# Patient Record
(demographics unavailable — no encounter records)

---

## 2024-12-18 NOTE — REVIEW OF SYSTEMS
[Negative] : Heme/Lymph [Ear Pain] : no ear pain [Ear Itch] : no ear itch [Hearing Loss] : no hearing loss [Dizziness] : no dizziness [Vertigo] : no vertigo [Recurrent Ear Infections] : no recurrent ear infections [Ear Drainage] : no ear drainage [Ear Noises] : no ear noises [Lightheadedness] : no lightheadedness [de-identified] : ears cleaning

## 2024-12-18 NOTE — PHYSICAL EXAM
[de-identified] : bilat cerumen impaction  [de-identified] : after cerumen removal  [Midline] : trachea located in midline position [Normal] : no rashes

## 2024-12-18 NOTE — ASSESSMENT
[FreeTextEntry1] : Patient c/o clogged ears bilat - has bilat impacted cerumen - after removal exam normal - has bilat symmetric snhl on prior audio   -  recommended followup 6 mos and recommended audio -  declined today

## 2025-01-24 NOTE — PHYSICAL EXAM
[de-identified] :                    Well appearing and nourished with no obvious deformities or distress.  Eyes:  No conjunctival injection and no xanthelasmas. HEENT:  Normocephalic.Normal oral mucosa. No pallor or cyanosis Neck:  No jugular venous distension. with normal A and V wave forms. No palpable adenopathy. Cardiovascular:  Normal rate and rhythm with normal S1, S2 and a grade 1/6 systolic murmur. Distal arterial pulses are normal. No significant peripheral edema. Pulmonary:  Lungs are clear to auscultation and percussion. Normal respiratory pattern without any accessory muscle use Abdomen:  Soft, non-tender ; no palpable organomegaly or masses. Extremities: No digital clubbing, cyanosis or ischemic changes. Skin:  No skin lesions, rashes, ulcers or xanthomas. Psychiatric:  Alert and oriented to person, place and time. Appropriate mood and affect.

## 2025-01-24 NOTE — ASSESSMENT
1.  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 Stressed the importance of strong glucose control. Advised of importance of at least yearly dilated examinations but to contact us immediately for any problems or concerns. 2. Cataract OU: Observe for now without intervention. The patient was advised to contact us if any change or worsening of vision3. AJ w/ PEK OU - Recommend ATs QID OU routinely 4. PVD w/o Tear OD - RD precautions. 5.  H/o CL Wear - (Monovision OS Near) Return for an appointment in 6 months 27 with Dr. Vincent Ramires. [FreeTextEntry1] :  Laboratory data  ------------9/1/2023---3/9/24--8/26/24 Chol-------194----------133------166 HDL--------55------------46-------55 LDL--------116-----------73-------94 Trigs--------69-----------66-------92  CT calcium score 12/21/2023 Total: 18 LAD: 18 Other arteries: 0.  Stress test 1/17/2025 Time: 12 minutes (13 minutes) Heart rate: 48 (94%) ECG: No significant ST-T wave changes.  Echocardiogram 1/17/2025 LVEF 60 to 65% No significant valvular disease Ascending aorta measuring within normal limits.  Echocardiogram 1/5/2024: Overall normal LV size and function ejection fraction is 65% No evidence of LVH Focal aortic valve sclerosis Mild dilatation of the ascending aorta 3.8 cm.  Impression: 1.  Hypertension: Adequate control now being achieved with lisinopril 20 mg      No evidence of hypertensive heart disease on echocardiography with hyperdynamic function.      HCT was discontinued after syncopal event 1/6/2024.  Probably accounted for by the weight loss and structured dietary controls.  2.  Hyperlipidemia with reasonable control based on the current data.  CAC is pretty low and patient is avidly dieting and exercising.  Expect to see further lowering of his LDL and subsequent blood work.  3.  Stress testing demonstrating very good exercise tolerance and absence of inducible ischemia arrhythmia.  Excellent exercise regimen with no exercise related symptoms  4.  Minor nonspecific ECG changes.  5.  Previously seen minimal dilatation of the ascending aorta 3.8 cm is not demonstrated on the current echo.  Plan: 1.  Can continue current atorvastatin dosing  Would like to see LDL less than 100.  2.  Patient will continue with lisinopril 20 mg daily and contact us if he has any progressive dizziness or orthostatic changes of significance.  3.  Continuation of aggressive diet and exercise  4.  No other changes in current management follow-up here in 6 to 12 months.

## 2025-01-24 NOTE — REASON FOR VISIT
[FreeTextEntry1] : 63 -year-old male presents here for cardiac evaluation   History includes: 1.  Hypertension 2.  Hyperlipidemia 3.  Mild dilatation of ascending aorta  Dx of hypertension November 2023, Rx with lisinopril - titrated upwards to lisinopril HCT 20/12.5 1/6/2024 patient had a syncopal event.  He had had other episodes of orthostatic type dizziness as a prodrome to this. Based on this we discontinue the hydrochlorothiazide component continue with just lisinopril 20 mg a day Review of his home blood pressures suggest that he has at this point averaging 123/70 mmHg on this dose.  The patient states his blood pressure tended to be a bit on the high normal side for a few years. Denies diabetes smoking or family Struve premature coronary disease History of hyperlipidemia on atorvastatin therapy.  He exercises avidly nearly 6 days a week both resistance and aerobic exercising without any difficulty.  There is no chest pain, shortness of breath, palpitations. No PND, orthopnea or edema. No syncope or history of heart murmur.  Has become more stringent with his diet both as regards caloric intake as well as sodium with improvement in his weight.  He does believe that he had dropped about 10 pounds before the syncopal event.

## 2025-01-26 NOTE — PHYSICAL EXAM
[de-identified] :                    Well appearing and nourished with no obvious deformities or distress.  Eyes:  No conjunctival injection and no xanthelasmas. HEENT:  Normocephalic.Normal oral mucosa. No pallor or cyanosis Neck:  No jugular venous distension. with normal A and V wave forms. No palpable adenopathy. Cardiovascular:  Normal rate and rhythm with normal S1, S2 and a grade 1/6 systolic murmur. Distal arterial pulses are normal. No significant peripheral edema. Pulmonary:  Lungs are clear to auscultation and percussion. Normal respiratory pattern without any accessory muscle use Abdomen:  Soft, non-tender ; no palpable organomegaly or masses. Extremities: No digital clubbing, cyanosis or ischemic changes. Skin:  No skin lesions, rashes, ulcers or xanthomas. Psychiatric:  Alert and oriented to person, place and time. Appropriate mood and affect.

## 2025-01-26 NOTE — ASSESSMENT
[FreeTextEntry1] : :ECG: Normal sinus rhythm at 79 bpm.  No significant ST or T wave changes.  Laboratory data  ------------9/1/2023---3/9/24 Chol-------194----------133 HDL--------55------------46 LDL--------116-----------73 Trigs--------69-----------66  CT calcium score 12/21/2023 Total: 18 LAD: 18 Other arteries: 0.  Echocardiogram 1/5/2024: Overall normal LV size and function ejection fraction is 65% No evidence of LVH Focal aortic valve sclerosis Mild dilatation of the ascending aorta 3.8 cm.  Impression: 1.  Hypertension: Adequate control now being achieved with lisinopril 20 mg      No evidence of hypertensive heart disease on echocardiography with hyperdynamic function.      HCT was discontinued after syncopal event 1/6/2024.  Probably accounted for by the weight loss and structured dietary controls.  2.  Hyperlipidemia with reasonable control based on the current data.  CAC is pretty low and patient is avidly dieting and exercising.  Expect to see further lowering of his LDL and subsequent blood work.  3.  Excellent exercise regimen with no exercise related symptoms  4.  Minor nonspecific ECG changes.  5.  Minimal dilatation of the ascending aorta 3.8 cm.  Plan: 1.  Can continue current atorvastatin dosing  Would like to see LDL less than 100.  2.  Patient will continue with lisinopril 20 mg daily and contact us if he has any progressive dizziness or orthostatic changes of significance.  3.  Continuation of aggressive diet and exercise  4.  Echocardiogram to reassess the ascending aorta dilatation and a screening exercise stress test in 6 months prior to follow-up.

## 2025-01-26 NOTE — REASON FOR VISIT
[FreeTextEntry1] : 62 -year-old male presents here for cardiac evaluation   History includes: 1.  Hypertension 2.  Hyperlipidemia 3.  Mild dilatation of ascending aorta  After detection of hypertension November 2023, treatment was started with lisinopril was titrated upwards to lisinopril HCT 20/12.5 1/6/2024 patient had a syncopal event.  He had had other episodes of orthostatic type dizziness as a prodrome to this. Based on this we discontinue the hydrochlorothiazide component continue with just lisinopril 20 mg a day Review of his home blood pressures suggest that he has at this point averaging 123/70 mmHg on this dose.  The patient states his blood pressure tended to be a bit on the high normal side for a few years. Denies diabetes smoking or family Struve premature coronary disease History of hyperlipidemia on atorvastatin therapy.  He exercises avidly nearly 6 days a week both resistance and aerobic exercising without any difficulty.  There is no chest pain, shortness of breath, palpitations. No PND, orthopnea or edema. No syncope or history of heart murmur.  Has become more stringent with his diet both as regards caloric intake as well as sodium with improvement in his weight.  He does believe that he had dropped about 10 pounds before the syncopal event.

## 2025-06-09 NOTE — PHYSICAL EXAM
[de-identified] : after cerumen removal  [de-identified] : bilat cerumen impaction  [Midline] : trachea located in midline position [Normal] : no rashes

## 2025-06-09 NOTE — ASSESSMENT
[FreeTextEntry1] : Patient c/o clogged ears bilat - has bilat impacted cerumen - after removal exam normal - has bilat symmetric snhl on prior audio   -  recommended followup 6 mos and recommended audio -  declined today again.

## 2025-06-23 NOTE — PHYSICAL EXAM
[de-identified] :                    Well appearing and nourished with no obvious deformities or distress.  Eyes:  No conjunctival injection and no xanthelasmas. HEENT:  Normocephalic.Normal oral mucosa. No pallor or cyanosis Neck:  No jugular venous distension. with normal A and V wave forms. No palpable adenopathy. Cardiovascular:  Normal rate and rhythm with normal S1, S2 and a grade 1/6 systolic murmur. Distal arterial pulses are normal. No significant peripheral edema. Pulmonary:  Lungs are clear to auscultation and percussion. Normal respiratory pattern without any accessory muscle use Abdomen:  Soft, non-tender ; no palpable organomegaly or masses. Extremities: No digital clubbing, cyanosis or ischemic changes. Skin:  No skin lesions, rashes, ulcers or xanthomas. Psychiatric:  Alert and oriented to person, place and time. Appropriate mood and affect.

## 2025-06-23 NOTE — ASSESSMENT
[FreeTextEntry1] :    ECG: Sinus rhythm at 81, right bundle branch block pattern with no significant ST-T wave changes.  Unchanged from prior.  Laboratory data  ------------9/1/2023---3/9/24--8/26/24--2/24/25 Chol-------194----------133------166------149 HDL--------55------------46-------55--------45 LDL--------116-----------73-------94--------88 Trigs--------69-----------66-------92--------85  CT calcium score 12/21/2023 Total: 18 LAD: 18 Other arteries: 0.  Stress test 1/17/2025 Time: 12 minutes (13 minutes) Heart rate: 48 (94%) ECG: No significant ST-T wave changes.  Echocardiogram 1/17/2025 LVEF 60 to 65% No significant valvular disease Ascending aorta measuring within normal limits.  Echocardiogram 1/5/2024: Overall normal LV size and function ejection fraction is 65% No evidence of LVH Focal aortic valve sclerosis Mild dilatation of the ascending aorta 3.8 cm.  Impression: 1.  Hypertension: Adequate control now being achieved with lisinopril 20 mg      No evidence of hypertensive heart disease on echocardiography with hyperdynamic function.      HCT was discontinued after syncopal event 1/6/2024.  Probably accounted for by the weight loss and structured dietary controls.  2.  Hyperlipidemia with reasonable control based on the current data.  CAC is pretty low and patient is avidly dieting and exercising.  Expect to see further lowering of his LDL and subsequent blood work.  3.  Stress testing demonstrating very good exercise tolerance and absence of inducible ischemia arrhythmia.  Excellent exercise regimen with no exercise related symptoms  4.  Minor nonspecific ECG changes.  5.  Previously seen minimal dilatation of the ascending aorta 3.8 cm is not demonstrated on the current echo.  Plan: 1.  Can continue current atorvastatin dosing  Would like to see LDL less than 100.  2.  Patient will continue with lisinopril 20 mg daily and contact us if he has any progressive dizziness or orthostatic changes of significance.  3.  Continuation of aggressive diet and exercise  4.  No other changes in current management follow-up here in 6 to 12 months.

## 2025-06-23 NOTE — PHYSICAL EXAM
[de-identified] :                    Well appearing and nourished with no obvious deformities or distress.  Eyes:  No conjunctival injection and no xanthelasmas. HEENT:  Normocephalic.Normal oral mucosa. No pallor or cyanosis Neck:  No jugular venous distension. with normal A and V wave forms. No palpable adenopathy. Cardiovascular:  Normal rate and rhythm with normal S1, S2 and a grade 1/6 systolic murmur. Distal arterial pulses are normal. No significant peripheral edema. Pulmonary:  Lungs are clear to auscultation and percussion. Normal respiratory pattern without any accessory muscle use Abdomen:  Soft, non-tender ; no palpable organomegaly or masses. Extremities: No digital clubbing, cyanosis or ischemic changes. Skin:  No skin lesions, rashes, ulcers or xanthomas. Psychiatric:  Alert and oriented to person, place and time. Appropriate mood and affect.

## 2025-06-23 NOTE — HISTORY OF PRESENT ILLNESS
[facility] cardiology notified of new consult [FreeTextEntry1] :   Following an elevated potassium level, patient cut down on potassium rich foods repeating the level that came down to 5.1.

## 2025-06-23 NOTE — HISTORY OF PRESENT ILLNESS
[FreeTextEntry1] :   Following an elevated potassium level, patient cut down on potassium rich foods repeating the level that came down to 5.1.